# Patient Record
Sex: MALE | URBAN - METROPOLITAN AREA
[De-identification: names, ages, dates, MRNs, and addresses within clinical notes are randomized per-mention and may not be internally consistent; named-entity substitution may affect disease eponyms.]

---

## 2022-01-11 ENCOUNTER — OFFICE VISIT (OUTPATIENT)
Dept: URBAN - METROPOLITAN AREA CLINIC 32 | Facility: CLINIC | Age: 42
End: 2022-01-11
Payer: MEDICAID

## 2022-01-11 DIAGNOSIS — H53.19 SUBJECTIVE VISUAL DISTURBANCES: ICD-10-CM

## 2022-01-11 PROCEDURE — 99204 OFFICE O/P NEW MOD 45 MIN: CPT | Performed by: OPTOMETRIST

## 2022-01-11 PROCEDURE — V2628 FABRICATION & FITTING: HCPCS | Performed by: OPTOMETRIST

## 2022-01-11 ASSESSMENT — INTRAOCULAR PRESSURE: OS: 8

## 2022-01-11 NOTE — IMPRESSION/PLAN
Impression: Anophthalmos: Q11.1. Plan: Patient needs a prosthetic eye due to the shrinking socket and atrophy of lids and surrounding tissue. Recommend a scleral shell with the possibility of socket and lid expansion with progressively larger prosthetic eyes.  disp conf

## 2022-01-11 NOTE — IMPRESSION/PLAN
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 Plan: There is no evidence of retinal pathology. All signs and risks of retinal detachment and tears were discussed in detail. Patient instructed to call the office immediately if any symptoms noted. Follow up in 1 week for MAC-OCT OS.  

DO NOT DILATE PATIENT

## 2022-01-25 ENCOUNTER — OFFICE VISIT (OUTPATIENT)
Dept: URBAN - METROPOLITAN AREA CLINIC 32 | Facility: CLINIC | Age: 42
End: 2022-01-25
Payer: MEDICAID

## 2022-01-25 PROCEDURE — 92134 CPTRZ OPH DX IMG PST SGM RTA: CPT | Performed by: OPTOMETRIST

## 2022-01-25 PROCEDURE — 92012 INTRM OPH EXAM EST PATIENT: CPT | Performed by: OPTOMETRIST

## 2022-01-25 ASSESSMENT — INTRAOCULAR PRESSURE: OS: 8

## 2022-01-25 ASSESSMENT — VISUAL ACUITY: OS: 20/20

## 2022-01-25 NOTE — IMPRESSION/PLAN
Impression: Anophthalmos: Q11.1. Plan: Patient needs a prosthetic eye due to the shrinking socket and atrophy of lids and surrounding tissue. Recommend a scleral shell with the possibility of socket and lid expansion with progressively larger prosthetic eyes.  11.75 4.00 2020 see photo 2L move iris 4mm nasally

## 2022-01-25 NOTE — IMPRESSION/PLAN
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 Plan: There is no evidence of retinal pathology. All signs and risks of retinal detachment and tears were discussed in detail. Patient instructed to call the office immediately if any symptoms noted. Follow up in 1 week for MAC-OCT OS. OCt normal, dry refraction 20/20, rtc for final refraction after prosthesis

## 2022-03-08 ENCOUNTER — OFFICE VISIT (OUTPATIENT)
Dept: URBAN - METROPOLITAN AREA CLINIC 32 | Facility: CLINIC | Age: 42
End: 2022-03-08
Payer: MEDICAID

## 2022-03-08 DIAGNOSIS — Q11.1 ANOPHTHALMOS: Primary | ICD-10-CM

## 2022-03-08 PROCEDURE — 92014 COMPRE OPH EXAM EST PT 1/>: CPT | Performed by: OPTOMETRIST

## 2022-03-08 PROCEDURE — V2627 SCLERAL COVER SHELL: HCPCS | Performed by: OPTOMETRIST

## 2022-03-08 ASSESSMENT — INTRAOCULAR PRESSURE: OS: 10

## 2022-03-08 NOTE — IMPRESSION/PLAN
Impression: Anophthalmos: Q11.1. Plan: Patient needs a prosthetic eye due to the shrinking socket and atrophy of lids and surrounding tissue. Recommend a scleral shell with the possibility of socket and lid expansion with progressively larger prosthetic eyes.  Disp Shell

## 2022-08-05 ENCOUNTER — OFFICE VISIT (OUTPATIENT)
Dept: URBAN - METROPOLITAN AREA CLINIC 32 | Facility: CLINIC | Age: 42
End: 2022-08-05
Payer: MEDICAID

## 2022-08-05 DIAGNOSIS — Q11.1 ANOPHTHALMOS: Primary | ICD-10-CM

## 2022-08-05 PROCEDURE — 92012 INTRM OPH EXAM EST PATIENT: CPT | Performed by: OPTOMETRIST

## 2022-08-05 PROCEDURE — V2624 POLISHING ARTIFICAL EYE: HCPCS | Performed by: OPTOMETRIST

## 2022-08-05 RX ORDER — LOTEPREDNOL ETABONATE AND TOBRAMYCIN 5; 3 MG/ML; MG/ML
SUSPENSION/ DROPS OPHTHALMIC
Qty: 5 | Refills: 6 | Status: ACTIVE
Start: 2022-08-05

## 2022-08-05 ASSESSMENT — INTRAOCULAR PRESSURE: OS: 10

## 2022-08-05 NOTE — IMPRESSION/PLAN
Impression: Anophthalmos: Q11.1. Plan: Irritation and Discharge due to build up on the prosthesis and mechanical irritation. Cleaned and polished prosthesis today. Use Artificial Tears PRN. Pataday BID OU PRN. Return to Clinic in 3 months for PRFU and further evaluation and management. RX'd Zylet BID

## 2022-11-04 ENCOUNTER — OFFICE VISIT (OUTPATIENT)
Dept: URBAN - METROPOLITAN AREA CLINIC 32 | Facility: CLINIC | Age: 42
End: 2022-11-04
Payer: MEDICAID

## 2022-11-04 DIAGNOSIS — Q11.1 ANOPHTHALMOS: Primary | ICD-10-CM

## 2022-11-04 PROCEDURE — V2624 POLISHING ARTIFICAL EYE: HCPCS | Performed by: OPTOMETRIST

## 2022-11-04 PROCEDURE — 92012 INTRM OPH EXAM EST PATIENT: CPT | Performed by: OPTOMETRIST

## 2022-11-04 ASSESSMENT — VISUAL ACUITY: OS: 20/25

## 2022-11-04 ASSESSMENT — INTRAOCULAR PRESSURE: OS: 10

## 2023-02-07 ENCOUNTER — OFFICE VISIT (OUTPATIENT)
Dept: URBAN - METROPOLITAN AREA CLINIC 32 | Facility: CLINIC | Age: 43
End: 2023-02-07
Payer: MEDICAID

## 2023-02-07 DIAGNOSIS — Q11.1 ANOPHTHALMOS: Primary | ICD-10-CM

## 2023-02-07 PROCEDURE — V2624 POLISHING ARTIFICAL EYE: HCPCS | Performed by: OPTOMETRIST

## 2023-02-07 PROCEDURE — 92012 INTRM OPH EXAM EST PATIENT: CPT | Performed by: OPTOMETRIST

## 2023-02-07 ASSESSMENT — INTRAOCULAR PRESSURE: OS: 14
